# Patient Record
Sex: MALE | Race: WHITE | NOT HISPANIC OR LATINO | URBAN - METROPOLITAN AREA
[De-identification: names, ages, dates, MRNs, and addresses within clinical notes are randomized per-mention and may not be internally consistent; named-entity substitution may affect disease eponyms.]

---

## 2017-02-20 ENCOUNTER — OUTPATIENT (OUTPATIENT)
Dept: OUTPATIENT SERVICES | Facility: HOSPITAL | Age: 67
LOS: 1 days | Discharge: HOME | End: 2017-02-20

## 2017-06-27 DIAGNOSIS — E78.00 PURE HYPERCHOLESTEROLEMIA, UNSPECIFIED: ICD-10-CM

## 2017-06-27 DIAGNOSIS — E11.9 TYPE 2 DIABETES MELLITUS WITHOUT COMPLICATIONS: ICD-10-CM

## 2017-06-27 DIAGNOSIS — I25.10 ATHEROSCLEROTIC HEART DISEASE OF NATIVE CORONARY ARTERY WITHOUT ANGINA PECTORIS: ICD-10-CM

## 2017-09-07 ENCOUNTER — OUTPATIENT (OUTPATIENT)
Dept: OUTPATIENT SERVICES | Facility: HOSPITAL | Age: 67
LOS: 1 days | Discharge: HOME | End: 2017-09-07

## 2017-09-07 DIAGNOSIS — E78.00 PURE HYPERCHOLESTEROLEMIA, UNSPECIFIED: ICD-10-CM

## 2017-09-07 DIAGNOSIS — M19.90 UNSPECIFIED OSTEOARTHRITIS, UNSPECIFIED SITE: ICD-10-CM

## 2017-09-07 DIAGNOSIS — D51.0 VITAMIN B12 DEFICIENCY ANEMIA DUE TO INTRINSIC FACTOR DEFICIENCY: ICD-10-CM

## 2017-09-07 DIAGNOSIS — I25.10 ATHEROSCLEROTIC HEART DISEASE OF NATIVE CORONARY ARTERY WITHOUT ANGINA PECTORIS: ICD-10-CM

## 2017-09-07 DIAGNOSIS — E11.9 TYPE 2 DIABETES MELLITUS WITHOUT COMPLICATIONS: ICD-10-CM

## 2017-11-20 ENCOUNTER — OUTPATIENT (OUTPATIENT)
Dept: OUTPATIENT SERVICES | Facility: HOSPITAL | Age: 67
LOS: 1 days | Discharge: HOME | End: 2017-11-20

## 2017-11-20 DIAGNOSIS — E78.00 PURE HYPERCHOLESTEROLEMIA, UNSPECIFIED: ICD-10-CM

## 2017-11-20 DIAGNOSIS — E03.9 HYPOTHYROIDISM, UNSPECIFIED: ICD-10-CM

## 2017-11-20 DIAGNOSIS — E11.9 TYPE 2 DIABETES MELLITUS WITHOUT COMPLICATIONS: ICD-10-CM

## 2017-11-20 DIAGNOSIS — I10 ESSENTIAL (PRIMARY) HYPERTENSION: ICD-10-CM

## 2017-12-13 ENCOUNTER — OUTPATIENT (OUTPATIENT)
Dept: OUTPATIENT SERVICES | Facility: HOSPITAL | Age: 67
LOS: 1 days | Discharge: HOME | End: 2017-12-13

## 2017-12-13 DIAGNOSIS — R73.09 OTHER ABNORMAL GLUCOSE: ICD-10-CM

## 2017-12-13 DIAGNOSIS — E78.00 PURE HYPERCHOLESTEROLEMIA, UNSPECIFIED: ICD-10-CM

## 2017-12-13 DIAGNOSIS — E55.9 VITAMIN D DEFICIENCY, UNSPECIFIED: ICD-10-CM

## 2017-12-20 ENCOUNTER — OUTPATIENT (OUTPATIENT)
Dept: OUTPATIENT SERVICES | Facility: HOSPITAL | Age: 67
LOS: 1 days | Discharge: HOME | End: 2017-12-20

## 2017-12-20 DIAGNOSIS — R05 COUGH: ICD-10-CM

## 2017-12-20 DIAGNOSIS — R91.1 SOLITARY PULMONARY NODULE: ICD-10-CM

## 2018-03-14 ENCOUNTER — OUTPATIENT (OUTPATIENT)
Dept: OUTPATIENT SERVICES | Facility: HOSPITAL | Age: 68
LOS: 1 days | Discharge: HOME | End: 2018-03-14

## 2018-03-14 DIAGNOSIS — I25.10 ATHEROSCLEROTIC HEART DISEASE OF NATIVE CORONARY ARTERY WITHOUT ANGINA PECTORIS: ICD-10-CM

## 2018-03-14 DIAGNOSIS — E78.00 PURE HYPERCHOLESTEROLEMIA, UNSPECIFIED: ICD-10-CM

## 2018-03-14 DIAGNOSIS — Z79.899 OTHER LONG TERM (CURRENT) DRUG THERAPY: ICD-10-CM

## 2018-05-08 ENCOUNTER — OUTPATIENT (OUTPATIENT)
Dept: OUTPATIENT SERVICES | Facility: HOSPITAL | Age: 68
LOS: 1 days | Discharge: HOME | End: 2018-05-08

## 2018-05-08 DIAGNOSIS — E78.01 FAMILIAL HYPERCHOLESTEROLEMIA: ICD-10-CM

## 2018-05-08 DIAGNOSIS — E11.9 TYPE 2 DIABETES MELLITUS WITHOUT COMPLICATIONS: ICD-10-CM

## 2018-05-08 DIAGNOSIS — I80.9 PHLEBITIS AND THROMBOPHLEBITIS OF UNSPECIFIED SITE: ICD-10-CM

## 2018-05-11 ENCOUNTER — OUTPATIENT (OUTPATIENT)
Dept: OUTPATIENT SERVICES | Facility: HOSPITAL | Age: 68
LOS: 1 days | Discharge: HOME | End: 2018-05-11

## 2018-05-11 DIAGNOSIS — M79.662 PAIN IN LEFT LOWER LEG: ICD-10-CM

## 2018-06-19 ENCOUNTER — OUTPATIENT (OUTPATIENT)
Dept: OUTPATIENT SERVICES | Facility: HOSPITAL | Age: 68
LOS: 1 days | Discharge: HOME | End: 2018-06-19

## 2018-06-19 DIAGNOSIS — R35.0 FREQUENCY OF MICTURITION: ICD-10-CM

## 2018-06-19 DIAGNOSIS — R30.0 DYSURIA: ICD-10-CM

## 2018-12-20 ENCOUNTER — OUTPATIENT (OUTPATIENT)
Dept: OUTPATIENT SERVICES | Facility: HOSPITAL | Age: 68
LOS: 1 days | Discharge: HOME | End: 2018-12-20

## 2018-12-20 DIAGNOSIS — I25.10 ATHEROSCLEROTIC HEART DISEASE OF NATIVE CORONARY ARTERY WITHOUT ANGINA PECTORIS: ICD-10-CM

## 2019-01-02 ENCOUNTER — OUTPATIENT (OUTPATIENT)
Dept: OUTPATIENT SERVICES | Facility: HOSPITAL | Age: 69
LOS: 1 days | Discharge: HOME | End: 2019-01-02

## 2019-01-02 DIAGNOSIS — E78.00 PURE HYPERCHOLESTEROLEMIA, UNSPECIFIED: ICD-10-CM

## 2019-01-02 DIAGNOSIS — E11.9 TYPE 2 DIABETES MELLITUS WITHOUT COMPLICATIONS: ICD-10-CM

## 2019-01-30 ENCOUNTER — OUTPATIENT (OUTPATIENT)
Dept: OUTPATIENT SERVICES | Facility: HOSPITAL | Age: 69
LOS: 1 days | Discharge: HOME | End: 2019-01-30

## 2019-01-30 DIAGNOSIS — R53.83 OTHER FATIGUE: ICD-10-CM

## 2019-01-30 DIAGNOSIS — I73.9 PERIPHERAL VASCULAR DISEASE, UNSPECIFIED: ICD-10-CM

## 2019-01-30 DIAGNOSIS — I65.23 OCCLUSION AND STENOSIS OF BILATERAL CAROTID ARTERIES: ICD-10-CM

## 2019-01-30 DIAGNOSIS — R51 HEADACHE: ICD-10-CM

## 2019-06-13 ENCOUNTER — APPOINTMENT (OUTPATIENT)
Dept: CARDIOLOGY | Facility: CLINIC | Age: 69
End: 2019-06-13

## 2019-06-17 ENCOUNTER — OUTPATIENT (OUTPATIENT)
Dept: OUTPATIENT SERVICES | Facility: HOSPITAL | Age: 69
LOS: 1 days | Discharge: HOME | End: 2019-06-17

## 2019-06-17 ENCOUNTER — LABORATORY RESULT (OUTPATIENT)
Age: 69
End: 2019-06-17

## 2019-06-17 DIAGNOSIS — E78.00 PURE HYPERCHOLESTEROLEMIA, UNSPECIFIED: ICD-10-CM

## 2019-06-17 DIAGNOSIS — Z79.899 OTHER LONG TERM (CURRENT) DRUG THERAPY: ICD-10-CM

## 2019-06-17 DIAGNOSIS — E11.9 TYPE 2 DIABETES MELLITUS WITHOUT COMPLICATIONS: ICD-10-CM

## 2019-06-17 DIAGNOSIS — I25.10 ATHEROSCLEROTIC HEART DISEASE OF NATIVE CORONARY ARTERY WITHOUT ANGINA PECTORIS: ICD-10-CM

## 2019-06-20 ENCOUNTER — APPOINTMENT (OUTPATIENT)
Dept: CARDIOLOGY | Facility: CLINIC | Age: 69
End: 2019-06-20

## 2019-09-25 ENCOUNTER — OUTPATIENT (OUTPATIENT)
Dept: OUTPATIENT SERVICES | Facility: HOSPITAL | Age: 69
LOS: 1 days | Discharge: HOME | End: 2019-09-25

## 2019-09-25 DIAGNOSIS — E78.00 PURE HYPERCHOLESTEROLEMIA, UNSPECIFIED: ICD-10-CM

## 2019-09-25 DIAGNOSIS — E11.9 TYPE 2 DIABETES MELLITUS WITHOUT COMPLICATIONS: ICD-10-CM

## 2019-12-17 ENCOUNTER — OUTPATIENT (OUTPATIENT)
Dept: OUTPATIENT SERVICES | Facility: HOSPITAL | Age: 69
LOS: 1 days | Discharge: HOME | End: 2019-12-17
Payer: MEDICARE

## 2019-12-17 DIAGNOSIS — R91.8 OTHER NONSPECIFIC ABNORMAL FINDING OF LUNG FIELD: ICD-10-CM

## 2019-12-17 PROCEDURE — 71250 CT THORAX DX C-: CPT | Mod: 26

## 2020-01-23 ENCOUNTER — APPOINTMENT (OUTPATIENT)
Dept: CARDIOLOGY | Facility: CLINIC | Age: 70
End: 2020-01-23
Payer: MEDICARE

## 2020-01-23 PROCEDURE — 93000 ELECTROCARDIOGRAM COMPLETE: CPT

## 2020-01-23 PROCEDURE — 99214 OFFICE O/P EST MOD 30 MIN: CPT

## 2020-07-29 ENCOUNTER — RECORD ABSTRACTING (OUTPATIENT)
Age: 70
End: 2020-07-29

## 2020-07-29 DIAGNOSIS — Z78.9 OTHER SPECIFIED HEALTH STATUS: ICD-10-CM

## 2020-07-29 DIAGNOSIS — I35.1 NONRHEUMATIC AORTIC (VALVE) INSUFFICIENCY: ICD-10-CM

## 2020-07-29 DIAGNOSIS — Z98.61 CORONARY ANGIOPLASTY STATUS: ICD-10-CM

## 2020-07-29 RX ORDER — TAMSULOSIN HYDROCHLORIDE 0.4 MG/1
0.4 CAPSULE ORAL
Refills: 0 | Status: ACTIVE | COMMUNITY

## 2020-08-05 ENCOUNTER — APPOINTMENT (OUTPATIENT)
Dept: CARDIOLOGY | Facility: CLINIC | Age: 70
End: 2020-08-05
Payer: MEDICARE

## 2020-08-05 PROCEDURE — 93306 TTE W/DOPPLER COMPLETE: CPT

## 2020-08-07 ENCOUNTER — LABORATORY RESULT (OUTPATIENT)
Age: 70
End: 2020-08-07

## 2020-08-13 ENCOUNTER — APPOINTMENT (OUTPATIENT)
Dept: CARDIOLOGY | Facility: CLINIC | Age: 70
End: 2020-08-13
Payer: MEDICARE

## 2020-08-13 VITALS
WEIGHT: 225 LBS | HEIGHT: 76 IN | TEMPERATURE: 98.3 F | DIASTOLIC BLOOD PRESSURE: 80 MMHG | SYSTOLIC BLOOD PRESSURE: 148 MMHG | HEART RATE: 84 BPM | BODY MASS INDEX: 27.4 KG/M2

## 2020-08-13 DIAGNOSIS — Z00.00 ENCOUNTER FOR GENERAL ADULT MEDICAL EXAMINATION W/OUT ABNORMAL FINDINGS: ICD-10-CM

## 2020-08-13 PROCEDURE — 99214 OFFICE O/P EST MOD 30 MIN: CPT

## 2020-08-13 PROCEDURE — 93000 ELECTROCARDIOGRAM COMPLETE: CPT

## 2020-09-20 ENCOUNTER — RX RENEWAL (OUTPATIENT)
Age: 70
End: 2020-09-20

## 2021-04-08 ENCOUNTER — APPOINTMENT (OUTPATIENT)
Dept: CARDIOLOGY | Facility: CLINIC | Age: 71
End: 2021-04-08

## 2021-04-19 ENCOUNTER — LABORATORY RESULT (OUTPATIENT)
Age: 71
End: 2021-04-19

## 2021-06-03 ENCOUNTER — APPOINTMENT (OUTPATIENT)
Dept: CARDIOLOGY | Facility: CLINIC | Age: 71
End: 2021-06-03

## 2022-01-04 ENCOUNTER — RX RENEWAL (OUTPATIENT)
Age: 72
End: 2022-01-04

## 2022-03-01 ENCOUNTER — LABORATORY RESULT (OUTPATIENT)
Age: 72
End: 2022-03-01

## 2022-03-16 ENCOUNTER — APPOINTMENT (OUTPATIENT)
Dept: CARDIOLOGY | Facility: CLINIC | Age: 72
End: 2022-03-16
Payer: MEDICARE

## 2022-03-16 VITALS — SYSTOLIC BLOOD PRESSURE: 142 MMHG | DIASTOLIC BLOOD PRESSURE: 80 MMHG

## 2022-03-16 PROCEDURE — 99213 OFFICE O/P EST LOW 20 MIN: CPT

## 2022-03-16 PROCEDURE — 93000 ELECTROCARDIOGRAM COMPLETE: CPT

## 2022-09-14 ENCOUNTER — APPOINTMENT (OUTPATIENT)
Dept: CARDIOLOGY | Facility: CLINIC | Age: 72
End: 2022-09-14

## 2022-12-13 ENCOUNTER — APPOINTMENT (OUTPATIENT)
Dept: CARDIOLOGY | Facility: CLINIC | Age: 72
End: 2022-12-13

## 2022-12-13 VITALS
HEART RATE: 60 BPM | DIASTOLIC BLOOD PRESSURE: 82 MMHG | HEIGHT: 76 IN | BODY MASS INDEX: 27.64 KG/M2 | OXYGEN SATURATION: 98 % | WEIGHT: 227 LBS | SYSTOLIC BLOOD PRESSURE: 154 MMHG

## 2022-12-13 DIAGNOSIS — Z78.9 OTHER SPECIFIED HEALTH STATUS: ICD-10-CM

## 2022-12-13 PROCEDURE — 99214 OFFICE O/P EST MOD 30 MIN: CPT | Mod: 25

## 2022-12-13 PROCEDURE — 93000 ELECTROCARDIOGRAM COMPLETE: CPT

## 2022-12-13 RX ORDER — CLOPIDOGREL 75 MG/1
75 TABLET, FILM COATED ORAL DAILY
Refills: 0 | Status: DISCONTINUED | COMMUNITY
End: 2022-12-13

## 2022-12-13 RX ORDER — ASPIRIN 81 MG
81 TABLET, DELAYED RELEASE (ENTERIC COATED) ORAL DAILY
Refills: 0 | Status: DISCONTINUED | COMMUNITY
End: 2022-12-13

## 2022-12-13 NOTE — ASSESSMENT
[FreeTextEntry1] : Mr. 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 He is borderline today and does not wish to be started on any new medications for the time being.\par \par RTC 6 months, sooner as needed.

## 2022-12-13 NOTE — HISTORY OF PRESENT ILLNESS
[FreeTextEntry1] : Mr. Go is a 72-year-old man with history of CAD s/p PCI of the LAD/D1 2011, HTN, HLD, and DM2 presenting for follow up.\par \par Patient previously followed with Dr. Allison and was last seen in office 3/2022.\par Today, overall feels well, denies active cardiopulmonary complaints though does not exert himself much. He works on his motorcycle at home but does go for long walks or climb stairs often due to severe knee discomfort.\par \par TTE 8/2020\par - Normal biventricular function, mild LA dilatation, mild MR, mod Ao sclerosis, possible BAV, AoRoot 4.1cm.\par \par Carotid Doppler 2019\par - Negative\par \par NM Stress Exercise 2018\par - 6 mins, 7 METS, 95% MPHR\par - Negative study\par \par CT Chest 2019\par - Extensive coronary calcifications\par \par Labs:\par - , TG 80, HDL 36, LDL 73\par - Cr 1.0, K 4.2\par \par Cardiac meds:\par - ASA 81mg\par - Plavix 75mg\par - Atorva 80mg\par - Losartan-HCTZ 100-25mg\par - Amlodipine 5mg

## 2023-05-18 ENCOUNTER — APPOINTMENT (OUTPATIENT)
Dept: CARDIOLOGY | Facility: CLINIC | Age: 73
End: 2023-05-18
Payer: MEDICARE

## 2023-05-18 PROCEDURE — 93306 TTE W/DOPPLER COMPLETE: CPT

## 2023-06-13 ENCOUNTER — APPOINTMENT (OUTPATIENT)
Dept: CARDIOLOGY | Facility: CLINIC | Age: 73
End: 2023-06-13
Payer: MEDICARE

## 2023-06-13 VITALS
HEIGHT: 76 IN | DIASTOLIC BLOOD PRESSURE: 84 MMHG | HEART RATE: 89 BPM | SYSTOLIC BLOOD PRESSURE: 138 MMHG | WEIGHT: 220 LBS | BODY MASS INDEX: 26.79 KG/M2

## 2023-06-13 PROCEDURE — 93000 ELECTROCARDIOGRAM COMPLETE: CPT

## 2023-06-13 PROCEDURE — 99214 OFFICE O/P EST MOD 30 MIN: CPT | Mod: 25

## 2023-06-13 RX ORDER — GLYBURIDE 5 MG/1
5 TABLET ORAL DAILY
Refills: 0 | Status: ACTIVE | COMMUNITY
Start: 2022-06-27

## 2023-06-13 RX ORDER — METFORMIN HYDROCHLORIDE 1000 MG/1
1000 TABLET, COATED ORAL
Refills: 0 | Status: ACTIVE | COMMUNITY
Start: 2021-10-22

## 2023-06-13 NOTE — HISTORY OF PRESENT ILLNESS
[FreeTextEntry1] : Mr. Go is a 73-year-old man with history of CAD s/p PCI of the LAD/D1 2011, HTN, HLD, and DM2 presenting for follow up.\par \par Patient previously followed with Dr. Allison and was last seen in office 3/2022.\par Today, overall feels well, denies active cardiopulmonary complaints though does not exert himself much. He works on his motorcycle at home but does go for long walks or climb stairs often due to severe knee discomfort.\par \par Notes he has a "terrible" diet.\par \par TTE 8/2020 - Normal biventricular function, mild LA dilatation, mild MR, mod Ao sclerosis, possible BAV, AoRoot 4.1cm.\par TTE 5/2023 - Normal systolic function, G1DD, sclerotic Ao (trileaflet), Ao root 4.2cm, AscAo 3.8cm.\par \par Carotid Doppler 2019\par - Negative\par \par NM Stress Exercise 2018\par - 6 mins, 7 METS, 95% MPHR\par - Negative study\par \par CT Chest 2019\par - Extensive coronary calcifications\par \par Labs:\par - , TG 80, HDL 36, LDL 73\par - Cr 1.0, K 4.2\par \par Cardiac meds:\par - ASA 81mg\par - Plavix 75mg\par - Atorva 80mg\par - Losartan-HCTZ 100-25mg\par - Amlodipine 5mg

## 2023-06-13 NOTE — ASSESSMENT
[FreeTextEntry1] : Mr. Go is a 73-year-old man with history of CAD s/p PCI of the LAD/D1 2011, HTN, HLD, and DM2 presenting for follow up.\par \par Impression:\par (1) CAD, elective PCI 2011 of the LAD/D1, no anginal complaints since\par (2) HLD, LDL 73, goal <70\par (3) HTN\par (4) NIDDM2\par (5) Ao root aneurysm\par \par Plan:\par - Low threshold for stress testing if patient develops anginal complaints. He is very sedentary and may not be exerting himself enough to manifest symptoms.\par - TTE annually to evaluate Ao root aneurysm\par - Continue amlodipine 10mg daily\par - Continue losartan-HCTZ 100-25mg daily\par - Continue plavix\par - Continue high intensity statin; consider adding Zetia to reach target <70. He is borderline today and does not wish to be started on any new medications for the time being.\par \par RTC 6 months, sooner as needed.

## 2023-09-19 ENCOUNTER — APPOINTMENT (OUTPATIENT)
Dept: CARDIOLOGY | Facility: CLINIC | Age: 73
End: 2023-09-19
Payer: MEDICARE

## 2023-09-19 VITALS
HEIGHT: 76 IN | WEIGHT: 225 LBS | BODY MASS INDEX: 27.4 KG/M2 | HEART RATE: 92 BPM | SYSTOLIC BLOOD PRESSURE: 122 MMHG | DIASTOLIC BLOOD PRESSURE: 70 MMHG

## 2023-09-19 PROCEDURE — 99214 OFFICE O/P EST MOD 30 MIN: CPT | Mod: 25

## 2023-09-19 PROCEDURE — 93000 ELECTROCARDIOGRAM COMPLETE: CPT

## 2023-10-19 ENCOUNTER — TRANSCRIPTION ENCOUNTER (OUTPATIENT)
Age: 73
End: 2023-10-19

## 2023-10-19 ENCOUNTER — INPATIENT (INPATIENT)
Facility: HOSPITAL | Age: 73
LOS: 0 days | Discharge: HOME | DRG: 322 | End: 2023-10-20
Attending: INTERNAL MEDICINE | Admitting: INTERNAL MEDICINE
Payer: MEDICARE

## 2023-10-19 VITALS
HEART RATE: 98 BPM | SYSTOLIC BLOOD PRESSURE: 167 MMHG | DIASTOLIC BLOOD PRESSURE: 82 MMHG | TEMPERATURE: 98 F | OXYGEN SATURATION: 98 % | RESPIRATION RATE: 16 BRPM | WEIGHT: 216.93 LBS

## 2023-10-19 DIAGNOSIS — I21.4 NON-ST ELEVATION (NSTEMI) MYOCARDIAL INFARCTION: ICD-10-CM

## 2023-10-19 LAB
ALBUMIN SERPL ELPH-MCNC: 4.6 G/DL — SIGNIFICANT CHANGE UP (ref 3.5–5.2)
ALBUMIN SERPL ELPH-MCNC: 4.6 G/DL — SIGNIFICANT CHANGE UP (ref 3.5–5.2)
ALP SERPL-CCNC: 112 U/L — SIGNIFICANT CHANGE UP (ref 30–115)
ALP SERPL-CCNC: 112 U/L — SIGNIFICANT CHANGE UP (ref 30–115)
ALT FLD-CCNC: 28 U/L — SIGNIFICANT CHANGE UP (ref 0–41)
ALT FLD-CCNC: 28 U/L — SIGNIFICANT CHANGE UP (ref 0–41)
ANION GAP SERPL CALC-SCNC: 10 MMOL/L — SIGNIFICANT CHANGE UP (ref 7–14)
ANION GAP SERPL CALC-SCNC: 10 MMOL/L — SIGNIFICANT CHANGE UP (ref 7–14)
AST SERPL-CCNC: 20 U/L — SIGNIFICANT CHANGE UP (ref 0–41)
AST SERPL-CCNC: 20 U/L — SIGNIFICANT CHANGE UP (ref 0–41)
BASOPHILS # BLD AUTO: 0.05 K/UL — SIGNIFICANT CHANGE UP (ref 0–0.2)
BASOPHILS # BLD AUTO: 0.05 K/UL — SIGNIFICANT CHANGE UP (ref 0–0.2)
BASOPHILS NFR BLD AUTO: 0.6 % — SIGNIFICANT CHANGE UP (ref 0–1)
BASOPHILS NFR BLD AUTO: 0.6 % — SIGNIFICANT CHANGE UP (ref 0–1)
BILIRUB SERPL-MCNC: 0.4 MG/DL — SIGNIFICANT CHANGE UP (ref 0.2–1.2)
BILIRUB SERPL-MCNC: 0.4 MG/DL — SIGNIFICANT CHANGE UP (ref 0.2–1.2)
BUN SERPL-MCNC: 15 MG/DL — SIGNIFICANT CHANGE UP (ref 10–20)
BUN SERPL-MCNC: 15 MG/DL — SIGNIFICANT CHANGE UP (ref 10–20)
CALCIUM SERPL-MCNC: 9.4 MG/DL — SIGNIFICANT CHANGE UP (ref 8.4–10.5)
CALCIUM SERPL-MCNC: 9.4 MG/DL — SIGNIFICANT CHANGE UP (ref 8.4–10.5)
CHLORIDE SERPL-SCNC: 101 MMOL/L — SIGNIFICANT CHANGE UP (ref 98–110)
CHLORIDE SERPL-SCNC: 101 MMOL/L — SIGNIFICANT CHANGE UP (ref 98–110)
CO2 SERPL-SCNC: 28 MMOL/L — SIGNIFICANT CHANGE UP (ref 17–32)
CO2 SERPL-SCNC: 28 MMOL/L — SIGNIFICANT CHANGE UP (ref 17–32)
CREAT SERPL-MCNC: 1 MG/DL — SIGNIFICANT CHANGE UP (ref 0.7–1.5)
CREAT SERPL-MCNC: 1 MG/DL — SIGNIFICANT CHANGE UP (ref 0.7–1.5)
EGFR: 79 ML/MIN/1.73M2 — SIGNIFICANT CHANGE UP
EGFR: 79 ML/MIN/1.73M2 — SIGNIFICANT CHANGE UP
EOSINOPHIL # BLD AUTO: 0.24 K/UL — SIGNIFICANT CHANGE UP (ref 0–0.7)
EOSINOPHIL # BLD AUTO: 0.24 K/UL — SIGNIFICANT CHANGE UP (ref 0–0.7)
EOSINOPHIL NFR BLD AUTO: 2.7 % — SIGNIFICANT CHANGE UP (ref 0–8)
EOSINOPHIL NFR BLD AUTO: 2.7 % — SIGNIFICANT CHANGE UP (ref 0–8)
GLUCOSE BLDC GLUCOMTR-MCNC: 105 MG/DL — HIGH (ref 70–99)
GLUCOSE BLDC GLUCOMTR-MCNC: 105 MG/DL — HIGH (ref 70–99)
GLUCOSE BLDC GLUCOMTR-MCNC: 115 MG/DL — HIGH (ref 70–99)
GLUCOSE BLDC GLUCOMTR-MCNC: 115 MG/DL — HIGH (ref 70–99)
GLUCOSE SERPL-MCNC: 234 MG/DL — HIGH (ref 70–99)
GLUCOSE SERPL-MCNC: 234 MG/DL — HIGH (ref 70–99)
HCT VFR BLD CALC: 47.9 % — SIGNIFICANT CHANGE UP (ref 42–52)
HCT VFR BLD CALC: 47.9 % — SIGNIFICANT CHANGE UP (ref 42–52)
HGB BLD-MCNC: 15.7 G/DL — SIGNIFICANT CHANGE UP (ref 14–18)
HGB BLD-MCNC: 15.7 G/DL — SIGNIFICANT CHANGE UP (ref 14–18)
IMM GRANULOCYTES NFR BLD AUTO: 0.6 % — HIGH (ref 0.1–0.3)
IMM GRANULOCYTES NFR BLD AUTO: 0.6 % — HIGH (ref 0.1–0.3)
LYMPHOCYTES # BLD AUTO: 0.99 K/UL — LOW (ref 1.2–3.4)
LYMPHOCYTES # BLD AUTO: 0.99 K/UL — LOW (ref 1.2–3.4)
LYMPHOCYTES # BLD AUTO: 11.3 % — LOW (ref 20.5–51.1)
LYMPHOCYTES # BLD AUTO: 11.3 % — LOW (ref 20.5–51.1)
MCHC RBC-ENTMCNC: 28 PG — SIGNIFICANT CHANGE UP (ref 27–31)
MCHC RBC-ENTMCNC: 28 PG — SIGNIFICANT CHANGE UP (ref 27–31)
MCHC RBC-ENTMCNC: 32.8 G/DL — SIGNIFICANT CHANGE UP (ref 32–37)
MCHC RBC-ENTMCNC: 32.8 G/DL — SIGNIFICANT CHANGE UP (ref 32–37)
MCV RBC AUTO: 85.5 FL — SIGNIFICANT CHANGE UP (ref 80–94)
MCV RBC AUTO: 85.5 FL — SIGNIFICANT CHANGE UP (ref 80–94)
MONOCYTES # BLD AUTO: 0.54 K/UL — SIGNIFICANT CHANGE UP (ref 0.1–0.6)
MONOCYTES # BLD AUTO: 0.54 K/UL — SIGNIFICANT CHANGE UP (ref 0.1–0.6)
MONOCYTES NFR BLD AUTO: 6.2 % — SIGNIFICANT CHANGE UP (ref 1.7–9.3)
MONOCYTES NFR BLD AUTO: 6.2 % — SIGNIFICANT CHANGE UP (ref 1.7–9.3)
NEUTROPHILS # BLD AUTO: 6.89 K/UL — HIGH (ref 1.4–6.5)
NEUTROPHILS # BLD AUTO: 6.89 K/UL — HIGH (ref 1.4–6.5)
NEUTROPHILS NFR BLD AUTO: 78.6 % — HIGH (ref 42.2–75.2)
NEUTROPHILS NFR BLD AUTO: 78.6 % — HIGH (ref 42.2–75.2)
NRBC # BLD: 0 /100 WBCS — SIGNIFICANT CHANGE UP (ref 0–0)
NRBC # BLD: 0 /100 WBCS — SIGNIFICANT CHANGE UP (ref 0–0)
PLATELET # BLD AUTO: 239 K/UL — SIGNIFICANT CHANGE UP (ref 130–400)
PLATELET # BLD AUTO: 239 K/UL — SIGNIFICANT CHANGE UP (ref 130–400)
PMV BLD: 10 FL — SIGNIFICANT CHANGE UP (ref 7.4–10.4)
PMV BLD: 10 FL — SIGNIFICANT CHANGE UP (ref 7.4–10.4)
POTASSIUM SERPL-MCNC: 4.1 MMOL/L — SIGNIFICANT CHANGE UP (ref 3.5–5)
POTASSIUM SERPL-MCNC: 4.1 MMOL/L — SIGNIFICANT CHANGE UP (ref 3.5–5)
POTASSIUM SERPL-SCNC: 4.1 MMOL/L — SIGNIFICANT CHANGE UP (ref 3.5–5)
POTASSIUM SERPL-SCNC: 4.1 MMOL/L — SIGNIFICANT CHANGE UP (ref 3.5–5)
PROT SERPL-MCNC: 6.8 G/DL — SIGNIFICANT CHANGE UP (ref 6–8)
PROT SERPL-MCNC: 6.8 G/DL — SIGNIFICANT CHANGE UP (ref 6–8)
RBC # BLD: 5.6 M/UL — SIGNIFICANT CHANGE UP (ref 4.7–6.1)
RBC # BLD: 5.6 M/UL — SIGNIFICANT CHANGE UP (ref 4.7–6.1)
RBC # FLD: 14.1 % — SIGNIFICANT CHANGE UP (ref 11.5–14.5)
RBC # FLD: 14.1 % — SIGNIFICANT CHANGE UP (ref 11.5–14.5)
SODIUM SERPL-SCNC: 139 MMOL/L — SIGNIFICANT CHANGE UP (ref 135–146)
SODIUM SERPL-SCNC: 139 MMOL/L — SIGNIFICANT CHANGE UP (ref 135–146)
TROPONIN T SERPL-MCNC: 0.07 NG/ML — CRITICAL HIGH
TROPONIN T SERPL-MCNC: 0.07 NG/ML — CRITICAL HIGH
WBC # BLD: 8.76 K/UL — SIGNIFICANT CHANGE UP (ref 4.8–10.8)
WBC # BLD: 8.76 K/UL — SIGNIFICANT CHANGE UP (ref 4.8–10.8)
WBC # FLD AUTO: 8.76 K/UL — SIGNIFICANT CHANGE UP (ref 4.8–10.8)
WBC # FLD AUTO: 8.76 K/UL — SIGNIFICANT CHANGE UP (ref 4.8–10.8)

## 2023-10-19 PROCEDURE — C1769: CPT

## 2023-10-19 PROCEDURE — C1874: CPT

## 2023-10-19 PROCEDURE — 80053 COMPREHEN METABOLIC PANEL: CPT

## 2023-10-19 PROCEDURE — 83735 ASSAY OF MAGNESIUM: CPT

## 2023-10-19 PROCEDURE — 93458 L HRT ARTERY/VENTRICLE ANGIO: CPT | Mod: 59

## 2023-10-19 PROCEDURE — 99285 EMERGENCY DEPT VISIT HI MDM: CPT

## 2023-10-19 PROCEDURE — 93458 L HRT ARTERY/VENTRICLE ANGIO: CPT | Mod: 26,XU

## 2023-10-19 PROCEDURE — C1725: CPT

## 2023-10-19 PROCEDURE — C1894: CPT

## 2023-10-19 PROCEDURE — 85025 COMPLETE CBC W/AUTO DIFF WBC: CPT

## 2023-10-19 PROCEDURE — 86803 HEPATITIS C AB TEST: CPT

## 2023-10-19 PROCEDURE — 93005 ELECTROCARDIOGRAM TRACING: CPT

## 2023-10-19 PROCEDURE — 80061 LIPID PANEL: CPT

## 2023-10-19 PROCEDURE — C1887: CPT

## 2023-10-19 PROCEDURE — 36415 COLL VENOUS BLD VENIPUNCTURE: CPT

## 2023-10-19 PROCEDURE — 92928 PRQ TCAT PLMT NTRAC ST 1 LES: CPT | Mod: RC

## 2023-10-19 PROCEDURE — C9600: CPT | Mod: RC

## 2023-10-19 PROCEDURE — 82962 GLUCOSE BLOOD TEST: CPT

## 2023-10-19 PROCEDURE — 71045 X-RAY EXAM CHEST 1 VIEW: CPT | Mod: 26

## 2023-10-19 RX ORDER — SODIUM CHLORIDE 9 MG/ML
1000 INJECTION INTRAMUSCULAR; INTRAVENOUS; SUBCUTANEOUS
Refills: 0 | Status: DISCONTINUED | OUTPATIENT
Start: 2023-10-19 | End: 2023-10-20

## 2023-10-19 RX ORDER — METFORMIN HYDROCHLORIDE 850 MG/1
1 TABLET ORAL
Refills: 0 | DISCHARGE

## 2023-10-19 RX ORDER — INFLUENZA VIRUS VACCINE 15; 15; 15; 15 UG/.5ML; UG/.5ML; UG/.5ML; UG/.5ML
0.7 SUSPENSION INTRAMUSCULAR ONCE
Refills: 0 | Status: DISCONTINUED | OUTPATIENT
Start: 2023-10-19 | End: 2023-10-20

## 2023-10-19 RX ORDER — DEXTROSE 50 % IN WATER 50 %
25 SYRINGE (ML) INTRAVENOUS ONCE
Refills: 0 | Status: DISCONTINUED | OUTPATIENT
Start: 2023-10-19 | End: 2023-10-20

## 2023-10-19 RX ORDER — ATORVASTATIN CALCIUM 80 MG/1
1 TABLET, FILM COATED ORAL
Refills: 0 | DISCHARGE

## 2023-10-19 RX ORDER — CLOPIDOGREL BISULFATE 75 MG/1
1 TABLET, FILM COATED ORAL
Refills: 0 | DISCHARGE

## 2023-10-19 RX ORDER — GLYBURIDE 5 MG
1 TABLET ORAL
Refills: 0 | DISCHARGE

## 2023-10-19 RX ORDER — METOPROLOL TARTRATE 50 MG
25 TABLET ORAL
Refills: 0 | Status: DISCONTINUED | OUTPATIENT
Start: 2023-10-19 | End: 2023-10-20

## 2023-10-19 RX ORDER — AMLODIPINE BESYLATE 2.5 MG/1
1 TABLET ORAL
Refills: 0 | DISCHARGE

## 2023-10-19 RX ORDER — ASPIRIN/CALCIUM CARB/MAGNESIUM 324 MG
81 TABLET ORAL DAILY
Refills: 0 | Status: DISCONTINUED | OUTPATIENT
Start: 2023-10-20 | End: 2023-10-20

## 2023-10-19 RX ORDER — INSULIN LISPRO 100/ML
VIAL (ML) SUBCUTANEOUS
Refills: 0 | Status: DISCONTINUED | OUTPATIENT
Start: 2023-10-19 | End: 2023-10-20

## 2023-10-19 RX ORDER — SODIUM CHLORIDE 9 MG/ML
1000 INJECTION, SOLUTION INTRAVENOUS
Refills: 0 | Status: DISCONTINUED | OUTPATIENT
Start: 2023-10-19 | End: 2023-10-20

## 2023-10-19 RX ORDER — ATORVASTATIN CALCIUM 80 MG/1
80 TABLET, FILM COATED ORAL AT BEDTIME
Refills: 0 | Status: DISCONTINUED | OUTPATIENT
Start: 2023-10-19 | End: 2023-10-20

## 2023-10-19 RX ORDER — INSULIN LISPRO 100/ML
VIAL (ML) SUBCUTANEOUS AT BEDTIME
Refills: 0 | Status: DISCONTINUED | OUTPATIENT
Start: 2023-10-19 | End: 2023-10-20

## 2023-10-19 RX ORDER — TAMSULOSIN HYDROCHLORIDE 0.4 MG/1
0.4 CAPSULE ORAL AT BEDTIME
Refills: 0 | Status: DISCONTINUED | OUTPATIENT
Start: 2023-10-19 | End: 2023-10-20

## 2023-10-19 RX ORDER — DEXTROSE 50 % IN WATER 50 %
15 SYRINGE (ML) INTRAVENOUS ONCE
Refills: 0 | Status: DISCONTINUED | OUTPATIENT
Start: 2023-10-19 | End: 2023-10-20

## 2023-10-19 RX ORDER — TAMSULOSIN HYDROCHLORIDE 0.4 MG/1
1 CAPSULE ORAL
Refills: 0 | DISCHARGE

## 2023-10-19 RX ORDER — LANOLIN ALCOHOL/MO/W.PET/CERES
3 CREAM (GRAM) TOPICAL AT BEDTIME
Refills: 0 | Status: DISCONTINUED | OUTPATIENT
Start: 2023-10-19 | End: 2023-10-20

## 2023-10-19 RX ORDER — CLOPIDOGREL BISULFATE 75 MG/1
75 TABLET, FILM COATED ORAL DAILY
Refills: 0 | Status: DISCONTINUED | OUTPATIENT
Start: 2023-10-19 | End: 2023-10-20

## 2023-10-19 RX ORDER — LOSARTAN POTASSIUM 100 MG/1
100 TABLET, FILM COATED ORAL DAILY
Refills: 0 | Status: DISCONTINUED | OUTPATIENT
Start: 2023-10-20 | End: 2023-10-20

## 2023-10-19 RX ORDER — GLUCAGON INJECTION, SOLUTION 0.5 MG/.1ML
1 INJECTION, SOLUTION SUBCUTANEOUS ONCE
Refills: 0 | Status: DISCONTINUED | OUTPATIENT
Start: 2023-10-19 | End: 2023-10-20

## 2023-10-19 RX ORDER — LOSARTAN/HYDROCHLOROTHIAZIDE 100MG-25MG
1 TABLET ORAL
Refills: 0 | DISCHARGE

## 2023-10-19 RX ORDER — ASPIRIN/CALCIUM CARB/MAGNESIUM 324 MG
162 TABLET ORAL ONCE
Refills: 0 | Status: COMPLETED | OUTPATIENT
Start: 2023-10-19 | End: 2023-10-19

## 2023-10-19 RX ORDER — AMLODIPINE BESYLATE 2.5 MG/1
10 TABLET ORAL DAILY
Refills: 0 | Status: DISCONTINUED | OUTPATIENT
Start: 2023-10-19 | End: 2023-10-20

## 2023-10-19 RX ORDER — DEXTROSE 50 % IN WATER 50 %
12.5 SYRINGE (ML) INTRAVENOUS ONCE
Refills: 0 | Status: DISCONTINUED | OUTPATIENT
Start: 2023-10-19 | End: 2023-10-20

## 2023-10-19 RX ORDER — ACETAMINOPHEN 500 MG
650 TABLET ORAL EVERY 6 HOURS
Refills: 0 | Status: DISCONTINUED | OUTPATIENT
Start: 2023-10-19 | End: 2023-10-20

## 2023-10-19 RX ADMIN — SODIUM CHLORIDE 150 MILLILITER(S): 9 INJECTION INTRAMUSCULAR; INTRAVENOUS; SUBCUTANEOUS at 18:53

## 2023-10-19 RX ADMIN — TAMSULOSIN HYDROCHLORIDE 0.4 MILLIGRAM(S): 0.4 CAPSULE ORAL at 22:41

## 2023-10-19 RX ADMIN — Medication 0: at 22:48

## 2023-10-19 RX ADMIN — Medication 25 MILLIGRAM(S): at 22:41

## 2023-10-19 RX ADMIN — Medication 162 MILLIGRAM(S): at 14:51

## 2023-10-19 RX ADMIN — ATORVASTATIN CALCIUM 80 MILLIGRAM(S): 80 TABLET, FILM COATED ORAL at 22:41

## 2023-10-19 RX ADMIN — CLOPIDOGREL BISULFATE 75 MILLIGRAM(S): 75 TABLET, FILM COATED ORAL at 16:53

## 2023-10-19 RX ADMIN — AMLODIPINE BESYLATE 10 MILLIGRAM(S): 2.5 TABLET ORAL at 22:41

## 2023-10-19 NOTE — PATIENT PROFILE ADULT - FALL HARM RISK - HARM RISK INTERVENTIONS

## 2023-10-19 NOTE — ED PROVIDER NOTE - PROGRESS NOTE DETAILS
pk: labs reviewed, elevated trop noted, will admit to cards tele for further eval and management of nstemi

## 2023-10-19 NOTE — ED PROVIDER NOTE - CLINICAL SUMMARY MEDICAL DECISION MAKING FREE TEXT BOX
73-year-old male history of CAD PCI x2 presenting for evaluation of chest discomfort radiating to the left neck.  Currently pain-free.  No lower extremity pain or swelling.  No shortness of breath.  Well appearing, NAD, non toxic. NCAT PERRLA EOMI neck supple non tender normal wob cta bl rrr abdomen s nt nd no rebound no guarding WWPx4 neuro non focal.  Labs EKG imaging reviewed.  Currently still pain-free.  Status post cardiology evaluation.  Will admit for further evaluation.

## 2023-10-19 NOTE — ED PROVIDER NOTE - CONSULTANT FREE TEXT FOR MDM DISCUSSED CASE WITH QUESTION
Outreach attempt was made to schedule a Medicare Wellness Visit. This was the first attempt. Contact was not made, left message.   RECALL IN    
Cardiology

## 2023-10-19 NOTE — DISCHARGE NOTE PROVIDER - REASON FOR ADMISSION
PAST MEDICAL HISTORY:  Asthma no recent exacerbation, not using inhalers for a long time    Hypertension     Pancreatitis due to alcohol     chest pain

## 2023-10-19 NOTE — ED ADULT NURSE NOTE - NSFALLUNIVINTERV_ED_ALL_ED
Bed/Stretcher in lowest position, wheels locked, appropriate side rails in place/Call bell, personal items and telephone in reach/Instruct patient to call for assistance before getting out of bed/chair/stretcher/Non-slip footwear applied when patient is off stretcher/[place] to call system/Physically safe environment - no spills, clutter or unnecessary equipment/Purposeful proactive rounding/Room/bathroom lighting operational, light cord in reach

## 2023-10-19 NOTE — PATIENT PROFILE ADULT - FALL HARM RISK - FALL HARM RISK
Arlette Ruff)  Obstetrics and Gynecology  [address] [place] [address]  Phone: [phone]  Fax: [phone]  Follow Up Time:   
Other

## 2023-10-19 NOTE — DISCHARGE NOTE PROVIDER - CARE PROVIDER_API CALL
Raad Bolton  Cardiovascular Disease  [address] [place] [address], [address] [address]  Phone: [phone]  Fax: [phone]  Follow Up Time: 2 weeks

## 2023-10-19 NOTE — H&P ADULT - HISTORY OF PRESENT ILLNESS
Patient is a 73-year-old man with history of CAD s/p PCI of the LAD/D1 2011, HTN, HLD, and DM2 presenting to ED with c/o left chest wall discomfort.   Of note, he had similar discomfort on exertion while mowing the lawn in september which prompted him to visit Dr. Bolton and at the time declined stress test. He now presents today due to recurrent left sided chest pain radiating to left arm now occurring at rest.    Patient denies palpitations, diaphoresis, headaches, LH, dizziness, orthopnea, PND, LOC, cough, feeling bloated, N/V/D, or musculoskeletal pain. Endorses compliance with medications.    In ED, BP: 167/82 , HR: 98, resp. 16, temp: 97, SpO2: 98.    ECG with no acute Ischemic changes compare to prior ecg. first troponin 0.07. He will be admitted to cardiology telemetry for further ischemic evaluation     Diagnostic studies:  TTE 8/2020 - Normal biventricular function, mild LA dilatation, mild MR, mod Ao sclerosis, possible BAV, AoRoot 4.1cm.  TTE 5/2023 - Normal systolic function, G1DD, sclerotic Ao (trileaflet), Ao root 4.2cm, AscAo 3.8cm.    NM Stress Exercise 2018  - 6 mins, 7 METS, 95% MPHR  - Negative study    CT Chest 2019  - Extensive coronary calcifications   Patient is a 73-year-old man with history of CAD s/p PCI of the LAD/D1 2011, HTN, HLD, and DM2 presenting to ED with c/o left chest wall discomfort.   Of note, he had similar discomfort on exertion while mowing the lawn in september which prompted him to visit Dr. Bolton and at the time declined stress test. He now presents today due to recurrent left sided chest pain radiating to left arm now occurring at rest. He took ASA 81mg prior to ED arrival. Currently chest pain free.   Patient denies palpitations, diaphoresis, headaches, LH, dizziness, orthopnea, PND, LOC, cough, feeling bloated, N/V/D, or musculoskeletal pain. Endorses compliance with medications.    In ED, BP: 167/82 , HR: 98, resp. 16, temp: 97, SpO2: 98.    ECG with no acute Ischemic changes compare to prior ecg. first troponin 0.07. ASA 324MG given He will be admitted to cardiology telemetry for further ischemic evaluation     Diagnostic studies:  TTE 8/2020 - Normal biventricular function, mild LA dilatation, mild MR, mod Ao sclerosis, possible BAV, AoRoot 4.1cm.  TTE 5/2023 - Normal systolic function, G1DD, sclerotic Ao (trileaflet), Ao root 4.2cm, AscAo 3.8cm.    NM Stress Exercise 2018  - 6 mins, 7 METS, 95% MPHR  - Negative study    CT Chest 2019  - Extensive coronary calcifications   Patient is a 73-year-old man with history of CAD s/p PCI of the LAD/D1 2011, HTN, HLD, and DM2 presenting to ED with c/o left chest wall discomfort.   Of note, he had similar discomfort on exertion while mowing the lawn in september which prompted him to visit Dr. Bolton and at the time declined stress test. He now presents today due to recurrent left sided chest pain radiating to left arm now occurring at rest. He took ASA 81mg prior to ED arrival. Currently chest pain free.   Patient denies palpitations, diaphoresis, headaches, LH, dizziness, orthopnea, PND, LOC, cough, feeling bloated, N/V/D, or musculoskeletal pain. Endorses compliance with medications.    In ED, BP: 167/82 , HR: 98, resp. 16, temp: 97, SpO2: 98.    ECG with  T wave inversion in lateral and inferior leads, no acute Ischemic changes compare to prior ecg. first troponin 0.07. ASA 324MG given He will be admitted to cardiology telemetry for further ischemic evaluation     Diagnostic studies:  TTE 8/2020 - Normal biventricular function, mild LA dilatation, mild MR, mod Ao sclerosis, possible BAV, AoRoot 4.1cm.  TTE 5/2023 - Normal systolic function, G1DD, sclerotic Ao (trileaflet), Ao root 4.2cm, AscAo 3.8cm.    NM Stress Exercise 2018  - 6 mins, 7 METS, 95% MPHR  - Negative study    CT Chest 2019  - Extensive coronary calcifications

## 2023-10-19 NOTE — H&P ADULT - NSICDXFAMILYHX_GEN_ALL_CORE_FT
FAMILY HISTORY:  Father  Still living? Unknown  Family history of coronary artery disease in father, Age at diagnosis: 61-70

## 2023-10-19 NOTE — H&P ADULT - NSICDXPASTMEDICALHX_GEN_ALL_CORE_FT
PAST MEDICAL HISTORY:  CAD S/P percutaneous coronary angioplasty     DM (diabetes mellitus)     HLD (hyperlipidemia)     HTN (hypertension)

## 2023-10-19 NOTE — H&P ADULT - NSHPPHYSICALEXAM_GEN_ALL_CORE
General: No apparent distress  HEENT: Anicteric sclera. Moist mucous membranes. no JVD   Cardiac: Regular rate and rhythm. No murmurs, rubs, or gallops.   Vascular: Symmetric radial pulses. Dorsalis pedis pulses palpable.   Respiratory: Normal effort. Bibasilar crackles. Clear to ascultation.   Abdomen: Soft, nontender. Audible bowel sounds.   Extremities: Warm with no edema. No cyanosis or clubbing.   Skin: Warm and dry. No rash.   Neurologic: Grossly normal motor function.   Psychiatric: Euthymic. Oriented to person, place, and time.

## 2023-10-19 NOTE — H&P ADULT - NSHPLABSRESULTS_GEN_ALL_CORE
- Labs:                        15.7   8.76  )-----------( 239      ( 19 Oct 2023 13:15 )             47.9     10-19    139  |  101  |  15  ----------------------------<  234<H>  4.1   |  28  |  1.0    Ca    9.4      19 Oct 2023 13:15    TPro  6.8  /  Alb  4.6  /  TBili  0.4  /  DBili  x   /  AST  20  /  ALT  28  /  AlkPhos  112  10-19    LIVER FUNCTIONS - ( 19 Oct 2023 13:15 )  Alb: 4.6 g/dL / Pro: 6.8 g/dL / ALK PHOS: 112 U/L / ALT: 28 U/L / AST: 20 U/L / GGT: x             Troponin T, Serum: 0.07 ng/mL (10-19 @ 13:15)    CARDIAC MARKERS ( 19 Oct 2023 13:15 )  x     / 0.07 ng/mL / x     / x     / x            Lactate Trend    Urinalysis Basic - ( 19 Oct 2023 13:15 )    Color: x / Appearance: x / SG: x / pH: x  Gluc: 234 mg/dL / Ketone: x  / Bili: x / Urobili: x   Blood: x / Protein: x / Nitrite: x   Leuk Esterase: x / RBC: x / WBC x   Sq Epi: x / Non Sq Epi: x / Bacteria: x

## 2023-10-19 NOTE — CHART NOTE - NSCHARTNOTEFT_GEN_A_CORE
PRE-OP DIAGNOSIS:  NSTEMI (AUC 8)      PROCEDURE:     [x] Coronary Angiogram     [x] LHC     [] LVG     [] RHC     [] Intervention (see below)         PHYSICIAN:  Dr. Mott    Fellow:  Dr. Ingram, Dr. Potts     PROCEDURE DESCRIPTION:     Consent:      [x] Patient     [] Family Member     []  Used        Anesthesia:     [] General     [x] Sedation     [x] Local        Access & Closure:     [x] 6-Fr Radial Artery => TR band    [] Fr Femoral Artery     [] Fr Femoral Vein     [] Fr Brachial Vein       IV Contrast: 60 mL        Intervention: PCI w/ balloon angioplasty and LILIAN x1 to distal RCA      Implants: SYNERGY XD 3.5X16MM       FINDINGS:     Coronary Dominance: Right       LM: Mild disease    LAD: Patent prior stent in the proximal segment with mild in-stent restenosis. Moderate diffuse distal disease  D1: Mild disease  D2: 100%  at the site of prior stent. Supply via L=>L collaterals     CX: Moderate ostial disease  OM1: Mild disease      RCA: severe 99% distal stenosis s/p PCI  RPDA: Mild disease       LVEDP: 9 mmHg     EF: ECHO pending         ESTIMATED BLOOD LOSS: < 10 mL        CONDITION:     [x] Good     [] Fair     [] Critical        SPECIMEN REMOVED: N/A       POST-OP DIAGNOSIS:      [] Normal Coronary Angiogram     [] Mild Coronary Artery Disease (< 50% stenosis)     [x] 2- Vessel Coronary Artery Disease ( RCA s/p PCI and D2 ). Plan for staged PCI of Diag if anginal symptoms persist       PLAN OF CARE:     [] D/C Home Today     [x] Return to In-patient bed     [] Admit for observation     [x] Return for Staged Procedure : Plan for staged PCI of Diag if anginal symptoms persist    [] CT Surgery Consult     [x] Medications: ASA/Plavix/high-intensity statin/ARB    [x] IV Fluids: 150 cc/hr x 6hrs

## 2023-10-19 NOTE — DISCHARGE NOTE PROVIDER - HOSPITAL COURSE
73-year-old man with history of CAD s/p PCI of the LAD/D1 2011, HTN, HLD, and DM2 presented to ED on 10/19/2023 with c/o left chest wall discomfort.   Of note, he had similar discomfort on exertion while mowing the lawn in september which prompted him to visit Dr. Bolton and at the time declined stress test. He now presents today due to recurrent left sided chest pain radiating to left arm now occurring at rest. He took ASA 81mg prior to ED arrival. Currently chest pain free.   Patient denies palpitations, diaphoresis, headaches, LH, dizziness, orthopnea, PND, LOC, cough, feeling bloated, N/V/D, or musculoskeletal pain. Endorses compliance with medications.    In ED, BP: 167/82 , HR: 98, resp. 16, temp: 97, SpO2: 98.    ECG with  T wave inversion in lateral and inferior leads, no acute Ischemic changes compare to prior ecg. first troponin 0.07. ASA 324MG given He will be admitted to cardiology telemetry for further ischemic evaluation   Patient underwent cardiac catheterization and now  patient undernoted [facility] which revealed: LM with mild disease,  LAD patent prior stent in the proximal segment with mild in-stent restenosis. Moderate diffuse distal disease, D1 with mild disease, D2 with 100%  at the site of prior stent. Supply via L=>L collaterals    LCX with Moderate ostial disease, RCA with severe 99% distal stenosis s/p PCI ( SYNERGY XD 3.5X16MM) w/ balloon angioplasty and LILIAN x1 to distal RCA, RPDA with mild disease.    Patient was monitored overnight. On POD 1 patient remains HD stable with no complaints. Patient remains in SR with no arrhythmias noted on tele. EKG performed showed no acute ST changes. Examination of right radial artery showed a C/DI site with no hematoma, erythema or bruit. Distal pulses are 2+ bilaterally. Renal function remains stable post cath. Patient will be discharged home on DAPT with ASA and Plavix.  Patient is being DC home in stable condition.

## 2023-10-19 NOTE — PROGRESS NOTE ADULT - SUBJECTIVE AND OBJECTIVE BOX
Patient is here for [facility]    Patient is a 73-year-old man with history of CAD s/p PCI of the LAD/D1 2011, HTN, HLD, and DM2 presenting to ED with c/o left chest wall discomfort.   Of note, he had similar discomfort on exertion while mowing the lawn in september which prompted him to visit Dr. Bolton and at the time declined stress test. He now presents today due to recurrent left sided chest pain radiating to left arm now occurring at rest.    Patient denies palpitations, diaphoresis, headaches, LH, dizziness, orthopnea, PND, LOC, cough, feeling bloated, N/V/D, or musculoskeletal pain. Endorses compliance with medications.    In ED, BP: 167/82 , HR: 98, resp. 16, temp: 97, SpO2: 98.    ECG with no acute Ischemic changes compare to prior ecg. first troponin 0.07. He will be admitted to cardiology telemetry for further ischemic evaluation     Diagnostic studies:  TTE 8/2020 - Normal biventricular function, mild LA dilatation, mild MR, mod Ao sclerosis, possible BAV, AoRoot 4.1cm.  TTE 5/2023 - Normal systolic function, G1DD, sclerotic Ao (trileaflet), Ao root 4.2cm, AscAo 3.8cm.                                           PREOPERATIVE DAY OF PROCEDURE EVALUATION:  I have personally seen and examined the patient. I agree with the history and physical which I have reviewed and noted any changes below. ( to be signed electronically by MD ) 10-19-23 @ 16:40    Plavix 75 mg PO x1 given prior Cardiac Cath        IV NS given prior Cardiac Cath [x ]  CathPCI Bleeding Risk score is: 0.8%       RIGHT RADIAL ARTERY EVALUATION:  SHREYA TEST: [ ] Negative          [x ] Positive    Anti- Anginal medications:                        [ ] not used                       [x ] used:  [x ]CCB  [ ] BB  [ ] Nitrate [ ] Ranexa                [ ] not used but strong indication not to use Patient is here for [facility]      Patient is a 73-year-old man with history of CAD s/p PCI of the LAD/D1 2011, HTN, HLD, and DM2 presenting to ED with c/o left chest wall discomfort.   Of note, he had similar discomfort on exertion while mowing the lawn in september which prompted him to visit Dr. Bolton and at the time declined stress test. He now presents today due to recurrent left sided chest pain radiating to left arm now occurring at rest.    Patient denies palpitations, diaphoresis, headaches, LH, dizziness, orthopnea, PND, LOC, cough, feeling bloated, N/V/D, or musculoskeletal pain. Endorses compliance with medications.    In ED, BP: 167/82 , HR: 98, resp. 16, temp: 97, SpO2: 98.    ECG with no acute Ischemic changes compare to prior ecg. first troponin 0.07. He will be admitted to cardiology telemetry for further ischemic evaluation     Diagnostic studies:  TTE 8/2020 - Normal biventricular function, mild LA dilatation, mild MR, mod Ao sclerosis, possible BAV, AoRoot 4.1cm.  TTE 5/2023 - Normal systolic function, G1DD, sclerotic Ao (trileaflet), Ao root 4.2cm, AscAo 3.8cm.                                           PREOPERATIVE DAY OF PROCEDURE EVALUATION:  I have personally seen and examined the patient. I agree with the history and physical which I have reviewed and noted any changes below. ( to be signed electronically by MD ) 10-19-23 @ 16:40    Plavix 75 mg PO x1 given prior Cardiac Cath        IV NS given prior Cardiac Cath [x ]  CathPCI Bleeding Risk score is: 0.8%       RIGHT RADIAL ARTERY EVALUATION:  SHREYA TEST: [ ] Negative          [x ] Positive    Anti- Anginal medications:                        [ ] not used                       [x ] used:  [x ]CCB  [ ] BB  [ ] Nitrate [ ] Ranexa                [ ] not used but strong indication not to use

## 2023-10-19 NOTE — DISCHARGE NOTE PROVIDER - NSDCCPTREATMENT_GEN_ALL_CORE_FT
PRINCIPAL PROCEDURE  Procedure: Cardiac catheterization, left heart  Findings and Treatment: - Please take aspirin 81 mg daily and plavix, unless directed by your Cardiologist.  - Please do not take metformin. You can restart this medication on 9/21/2023 in the morning.   - Do not drive or operate heavy machinery for 24 hours.  - After 24 hours, you may shower and remove the dressing from the site.  - Avoid using affected arm for 24 hours.  - No heavy lifting (objects more than 5 lbs) for 1 week.  - Do not bathe or swim for 1 week.   - Do not rub or apply lotion, cream, or powder to the affected site. Leave it open to the air.   - Any sudden swelling, redness, fever, discharge, or severe pain, call your Cardiologist or call the Catheterization Lab at [phone].  - If there is bleeding from the puncture site, apply direct firm pressure on the site and call 911.     PRINCIPAL PROCEDURE  Procedure: Cardiac catheterization, left heart  Findings and Treatment: - Please take aspirin 81 mg daily and plavix, unless directed by your Cardiologist.  - Please do not take metformin. You can restart this medication on 10/22/2023 in the morning.   - Do not drive or operate heavy machinery for 24 hours.  - After 24 hours, you may shower and remove the dressing from the site.  - Avoid using affected arm for 24 hours.  - No heavy lifting (objects more than 5 lbs) for 1 week.  - Do not bathe or swim for 1 week.   - Do not rub or apply lotion, cream, or powder to the affected site. Leave it open to the air.   - Any sudden swelling, redness, fever, discharge, or severe pain, call your Cardiologist or call the Catheterization Lab at [phone].  - If there is bleeding from the puncture site, apply direct firm pressure on the site and call 911.

## 2023-10-19 NOTE — H&P ADULT - ASSESSMENT
Patient is a 73-year-old man with history of CAD s/p PCI of the LAD/D1 2011, HTN, HLD, and DM2 presenting to ED with c/o left chest wall discomfort. Admitted to cardiology telemetry for NSTEMI.    NSTEMI    - Admit to cardiology telemetry  - Check TTE  - Check lipid profile, HBA1C  - Give ASA 325mg POx1, then 81mg Q24, Plavix  - Lipitor 80mg QHS,   -  cardiac cath today         DIET: NPO  Code status: Full code   DVT prophylaxis: compression device  Patient is a 73-year-old man with history of CAD s/p PCI of the LAD/D1 2011, HTN, HLD, and DM2 presenting to ED with c/o left chest wall discomfort. Admitted to cardiology telemetry for NSTEMI.    #NSTEMI  #HX CAD sp PCI LAD/D1 2011  In ED,  ECG with T wave inversion in lateral and inferior leads. No acute Ischemic changes compare to prior ecg. first troponin 0.07. ASA 324MG po x1 given   TTE 5/2023 - Normal systolic function, G1DD, sclerotic Ao (trileaflet), Ao root 4.2cm, AscAo 3.8cm.  - Admit to cardiology telemetry  -  cont with ASA 81mg Q24, Plavix 75mg po  - Lipitor 80mg QHS,   -  cardiac cath today by Dr. Mott     #HTN  - cont on losartan 100mg + hctz 25mg po daily  - cont on amlodipine 10mg po daily      #HLD  9/11th LDL was 61, cholesterol 111mg/dl  - cont on lipitor 80mg     #DM II  Hgb A1C 9/11 was 7.5  - takes glyburide 5mg daily and metformin 1000mg BID ( hold while inpatient)  - ISS   - check F.S ACHS      #BPH   - Cont on flomax 0.4mg po daily      DIET: NPO for [facility]   Code status: Full code   DVT prophylaxis: sequential  compression device  Patient is a 73-year-old man with history of CAD s/p PCI of the LAD/D1 2011, HTN, HLD, and DM2 presenting to ED with c/o left chest wall discomfort. Admitted to cardiology telemetry for NSTEMI.    #NSTEMI  #HX CAD sp PCI LAD/D1 2011  In ED,  ECG with T wave inversion in lateral and inferior leads. No acute Ischemic changes compare to prior ecg. first troponin 0.07. ASA 324MG po x1 given   TTE 5/2023 - Normal systolic function, G1DD, sclerotic Ao (trileaflet), Ao root 4.2cm, AscAo 3.8cm.  - Admit to cardiology telemetry  -  cont with ASA 81mg Q24, Plavix 75mg po  - start metoprolol 25mg BID   - Lipitor 80mg QHS,   -  cardiac cath today by Dr. Mott     #HTN  - cont on losartan 100mg + hctz 25mg po daily  - cont on amlodipine 10mg po daily      #HLD  9/11th LDL was 61, cholesterol 111mg/dl  - cont on lipitor 80mg     #DM II  Hgb A1C 9/11 was 7.5  - takes glyburide 5mg daily and metformin 1000mg BID ( hold while inpatient)  - ISS   - check F.S ACHS      #BPH   - Cont on flomax 0.4mg po daily      DIET: NPO for [facility]   Code status: Full code   DVT prophylaxis: sequential  compression device

## 2023-10-19 NOTE — ED PROVIDER NOTE - OBJECTIVE STATEMENT
Detail Level: Detailed Quality 110: Preventive Care And Screening: Influenza Immunization: Influenza Immunization Administered during Influenza season Patient is a 73-year-old man with history of CAD s/p PCI of the LAD/D1 2011, HTN, HLD, and DM2 presenting to ED with c/o left chest wall discomfort. Of note, he had similar discomfort on exertion while mowing the lawn in september which prompted him to visit Dr. Bolton and at the time declined stress test. He now presents today due to recurrent left sided chest pain radiating to left arm now occurring at rest. He took ASA 81mg prior to ED arrival. Otherwise denies any fever, chills, headache, changes in vision, cough, congestion, palpitations, sob, n/v/d, abd pain, constipation, urinary complaints, lower extremity pain/swelling.

## 2023-10-19 NOTE — PATIENT PROFILE ADULT - FALL HARM RISK - CONCLUSION
Fall with Harm Risk Complexity (Necessary For Coding; Major - 90 Day Global With Some Exceptions; Minor - 10 Day Global): minor Initial Decision For Surgery?: No Risk Assessment Explanation (Does Not Render In The Note): Clinical determination of the probability and/or consequences of an event, such as surgery. Clinical assessment of the level of risk is affected by the nature of the event under consideration for the patient. Modifier 57 is used to indicate an Evaluation and Management (E/M) service resulted in the initial decision to perform surgery either the day before a major surgery (90 day global) or the day of a major surgery. Discussion: The nature of the lesion(s) was discussed.  Questions were answered.  I recommend surgery as appropriate treatment due to the site, size and histologic type of the tumor.  Surgery will mitigate risk of poor outcomes due to recurrence.  The procedure, morbidity (bruising, swelling, wound care, bandaging), activity restrictions, time off work and complications (bleeding, infection, scarring) were reviewed.  The potential appearance of the scar was discussed.  Questions were answered.  Their procedure was performed today, see dictation Date Of Surgery - Today Or Tomorrow?: today

## 2023-10-19 NOTE — DISCHARGE NOTE PROVIDER - NSDCMRMEDTOKEN_GEN_ALL_CORE_FT
amLODIPine 10 mg oral tablet: 1 tab(s) orally once a day  atorvastatin 80 mg oral tablet: 1 tab(s) orally once a day  clopidogrel 75 mg oral tablet: 1 tab(s) orally once a day  Flomax 0.4 mg oral capsule: 1 cap(s) orally once a day  glyBURIDE 5 mg oral tablet: 1 tab(s) orally once a day  losartan-hydroCHLOROthiazide 100 mg-25 mg oral tablet: 1 tab(s) orally once a day  metFORMIN 1000 mg oral tablet: 1 tab(s) orally every 12 hours   amLODIPine 10 mg oral tablet: 1 tab(s) orally once a day  aspirin 81 mg oral delayed release tablet: 1 tab(s) orally once a day  atorvastatin 80 mg oral tablet: 1 tab(s) orally once a day  clopidogrel 75 mg oral tablet: 1 tab(s) orally once a day  Flomax 0.4 mg oral capsule: 1 cap(s) orally once a day  glyBURIDE 5 mg oral tablet: 1 tab(s) orally once a day  hydroCHLOROthiazide 25 mg oral tablet: 1 tab(s) orally once a day  losartan-hydroCHLOROthiazide 100 mg-25 mg oral tablet: 1 tab(s) orally once a day  metFORMIN 1000 mg oral tablet: 1 tab(s) orally every 12 hours   amLODIPine 10 mg oral tablet: 1 tab(s) orally once a day  aspirin 81 mg oral delayed release tablet: 1 tab(s) orally once a day  atorvastatin 80 mg oral tablet: 1 tab(s) orally once a day  clopidogrel 75 mg oral tablet: 1 tab(s) orally once a day  Flomax 0.4 mg oral capsule: 1 cap(s) orally once a day  glyBURIDE 5 mg oral tablet: 1 tab(s) orally once a day  hydroCHLOROthiazide 25 mg oral tablet: 1 tab(s) orally once a day  losartan-hydroCHLOROthiazide 100 mg-25 mg oral tablet: 1 tab(s) orally once a day  metFORMIN 1000 mg oral tablet: 1 tab(s) orally every 12 hours  Toprol-XL 50 mg oral tablet, extended release: 1 tab(s) orally once a day

## 2023-10-19 NOTE — H&P ADULT - NS ATTEND AMEND GEN_ALL_CORE FT
Patient interviewed/examined on 10/20/2023.     73M with CAD s/p PCI who presented with chest pain. Admitted for NSTEMI.     S/p C 10/19/23 s/p LILIAN to 99% distal RCA, D2 with 100%  with collaterals    Continue DAPT, statin.   Add BB.     Martina Salazar MD  Vascular Cardiology Attending  Please text or call via MS Teams with questions

## 2023-10-19 NOTE — DISCHARGE NOTE PROVIDER - NSDCFUSCHEDAPPT_GEN_ALL_CORE_FT
Raad Bolton  [facility] Physician [facility]  CARDIOLOGY [address] [place] [address]  Scheduled Appointment: 01/16/2024

## 2023-10-19 NOTE — ED ADULT NURSE NOTE - NSFALLRISKASMTTYPE_ED_ALL_ED
Covid testing has been completed and no covid concerns at this time. Patient is compliant wearing his facial mask and practicing social distancing.   Initial (On Arrival)

## 2023-10-19 NOTE — CHART NOTE - NSCHARTNOTEFT_GEN_A_CORE
Interventional Cardiology Radial band Removal Note    s/p Heparin 			    Pt without complaints.  VSS.    Pre removal exam  Right Radial access site TR Band in place, NO hematoma, NO bleed  Radial pulse: 2+ palpable pulse     TR Band  2 CC removed at 930pm  2_CC removed at _940pm  2_CC removed at 950pm  2 CC removed at 10pm  2cc removed at 1010pm    Hemostasis achieved with manual release of band.      No Vasovagal reaction.    Meds given: No med given     Post removal exam  Right  Radial access site NO hematoma, No bleed  Radial pulse: 2+ palpable     A/P:  s/p  PCI w/ balloon angioplasty and LILIAN x1 to distal RCA  -	continue to monitor  -	-OOB as tolerated  -	Post Procedure Instructions given  -                   Call [phone] if any access site issues.

## 2023-10-19 NOTE — DISCHARGE NOTE PROVIDER - NSDCCPCAREPLAN_GEN_ALL_CORE_FT
PRINCIPAL DISCHARGE DIAGNOSIS  Diagnosis: NSTEMI (non-ST elevation myocardial infarction)  Assessment and Plan of Treatment:       SECONDARY DISCHARGE DIAGNOSES  Diagnosis: CAD (coronary artery disease)  Assessment and Plan of Treatment:

## 2023-10-20 ENCOUNTER — TRANSCRIPTION ENCOUNTER (OUTPATIENT)
Age: 73
End: 2023-10-20

## 2023-10-20 VITALS
RESPIRATION RATE: 18 BRPM | OXYGEN SATURATION: 96 % | DIASTOLIC BLOOD PRESSURE: 77 MMHG | SYSTOLIC BLOOD PRESSURE: 139 MMHG | HEART RATE: 74 BPM | TEMPERATURE: 98 F

## 2023-10-20 LAB
ALBUMIN SERPL ELPH-MCNC: 3.7 G/DL — SIGNIFICANT CHANGE UP (ref 3.5–5.2)
ALBUMIN SERPL ELPH-MCNC: 3.7 G/DL — SIGNIFICANT CHANGE UP (ref 3.5–5.2)
ALP SERPL-CCNC: 89 U/L — SIGNIFICANT CHANGE UP (ref 30–115)
ALP SERPL-CCNC: 89 U/L — SIGNIFICANT CHANGE UP (ref 30–115)
ALT FLD-CCNC: 25 U/L — SIGNIFICANT CHANGE UP (ref 0–41)
ALT FLD-CCNC: 25 U/L — SIGNIFICANT CHANGE UP (ref 0–41)
ANION GAP SERPL CALC-SCNC: 14 MMOL/L — SIGNIFICANT CHANGE UP (ref 7–14)
ANION GAP SERPL CALC-SCNC: 14 MMOL/L — SIGNIFICANT CHANGE UP (ref 7–14)
AST SERPL-CCNC: 23 U/L — SIGNIFICANT CHANGE UP (ref 0–41)
AST SERPL-CCNC: 23 U/L — SIGNIFICANT CHANGE UP (ref 0–41)
BASOPHILS # BLD AUTO: 0.06 K/UL — SIGNIFICANT CHANGE UP (ref 0–0.2)
BASOPHILS # BLD AUTO: 0.06 K/UL — SIGNIFICANT CHANGE UP (ref 0–0.2)
BASOPHILS NFR BLD AUTO: 0.7 % — SIGNIFICANT CHANGE UP (ref 0–1)
BASOPHILS NFR BLD AUTO: 0.7 % — SIGNIFICANT CHANGE UP (ref 0–1)
BILIRUB SERPL-MCNC: 0.5 MG/DL — SIGNIFICANT CHANGE UP (ref 0.2–1.2)
BILIRUB SERPL-MCNC: 0.5 MG/DL — SIGNIFICANT CHANGE UP (ref 0.2–1.2)
BUN SERPL-MCNC: 11 MG/DL — SIGNIFICANT CHANGE UP (ref 10–20)
BUN SERPL-MCNC: 11 MG/DL — SIGNIFICANT CHANGE UP (ref 10–20)
CALCIUM SERPL-MCNC: 8.8 MG/DL — SIGNIFICANT CHANGE UP (ref 8.4–10.5)
CALCIUM SERPL-MCNC: 8.8 MG/DL — SIGNIFICANT CHANGE UP (ref 8.4–10.5)
CHLORIDE SERPL-SCNC: 103 MMOL/L — SIGNIFICANT CHANGE UP (ref 98–110)
CHLORIDE SERPL-SCNC: 103 MMOL/L — SIGNIFICANT CHANGE UP (ref 98–110)
CHOLEST SERPL-MCNC: 109 MG/DL — SIGNIFICANT CHANGE UP
CHOLEST SERPL-MCNC: 109 MG/DL — SIGNIFICANT CHANGE UP
CO2 SERPL-SCNC: 20 MMOL/L — SIGNIFICANT CHANGE UP (ref 17–32)
CO2 SERPL-SCNC: 20 MMOL/L — SIGNIFICANT CHANGE UP (ref 17–32)
CREAT SERPL-MCNC: 0.8 MG/DL — SIGNIFICANT CHANGE UP (ref 0.7–1.5)
CREAT SERPL-MCNC: 0.8 MG/DL — SIGNIFICANT CHANGE UP (ref 0.7–1.5)
EGFR: 93 ML/MIN/1.73M2 — SIGNIFICANT CHANGE UP
EGFR: 93 ML/MIN/1.73M2 — SIGNIFICANT CHANGE UP
EOSINOPHIL # BLD AUTO: 0.3 K/UL — SIGNIFICANT CHANGE UP (ref 0–0.7)
EOSINOPHIL # BLD AUTO: 0.3 K/UL — SIGNIFICANT CHANGE UP (ref 0–0.7)
EOSINOPHIL NFR BLD AUTO: 3.5 % — SIGNIFICANT CHANGE UP (ref 0–8)
EOSINOPHIL NFR BLD AUTO: 3.5 % — SIGNIFICANT CHANGE UP (ref 0–8)
GLUCOSE BLDC GLUCOMTR-MCNC: 131 MG/DL — HIGH (ref 70–99)
GLUCOSE BLDC GLUCOMTR-MCNC: 131 MG/DL — HIGH (ref 70–99)
GLUCOSE SERPL-MCNC: 101 MG/DL — HIGH (ref 70–99)
GLUCOSE SERPL-MCNC: 101 MG/DL — HIGH (ref 70–99)
HCT VFR BLD CALC: 43.4 % — SIGNIFICANT CHANGE UP (ref 42–52)
HCT VFR BLD CALC: 43.4 % — SIGNIFICANT CHANGE UP (ref 42–52)
HCV AB S/CO SERPL IA: 0.88 COI — SIGNIFICANT CHANGE UP
HCV AB S/CO SERPL IA: 0.88 COI — SIGNIFICANT CHANGE UP
HCV AB SERPL-IMP: SIGNIFICANT CHANGE UP
HCV AB SERPL-IMP: SIGNIFICANT CHANGE UP
HDLC SERPL-MCNC: 29 MG/DL — LOW
HDLC SERPL-MCNC: 29 MG/DL — LOW
HGB BLD-MCNC: 14.4 G/DL — SIGNIFICANT CHANGE UP (ref 14–18)
HGB BLD-MCNC: 14.4 G/DL — SIGNIFICANT CHANGE UP (ref 14–18)
IMM GRANULOCYTES NFR BLD AUTO: 0.3 % — SIGNIFICANT CHANGE UP (ref 0.1–0.3)
IMM GRANULOCYTES NFR BLD AUTO: 0.3 % — SIGNIFICANT CHANGE UP (ref 0.1–0.3)
LIPID PNL WITH DIRECT LDL SERPL: 60 MG/DL — SIGNIFICANT CHANGE UP
LIPID PNL WITH DIRECT LDL SERPL: 60 MG/DL — SIGNIFICANT CHANGE UP
LYMPHOCYTES # BLD AUTO: 1.2 K/UL — SIGNIFICANT CHANGE UP (ref 1.2–3.4)
LYMPHOCYTES # BLD AUTO: 1.2 K/UL — SIGNIFICANT CHANGE UP (ref 1.2–3.4)
LYMPHOCYTES # BLD AUTO: 13.9 % — LOW (ref 20.5–51.1)
LYMPHOCYTES # BLD AUTO: 13.9 % — LOW (ref 20.5–51.1)
MAGNESIUM SERPL-MCNC: 1.6 MG/DL — LOW (ref 1.8–2.4)
MAGNESIUM SERPL-MCNC: 1.6 MG/DL — LOW (ref 1.8–2.4)
MCHC RBC-ENTMCNC: 28.5 PG — SIGNIFICANT CHANGE UP (ref 27–31)
MCHC RBC-ENTMCNC: 28.5 PG — SIGNIFICANT CHANGE UP (ref 27–31)
MCHC RBC-ENTMCNC: 33.2 G/DL — SIGNIFICANT CHANGE UP (ref 32–37)
MCHC RBC-ENTMCNC: 33.2 G/DL — SIGNIFICANT CHANGE UP (ref 32–37)
MCV RBC AUTO: 85.9 FL — SIGNIFICANT CHANGE UP (ref 80–94)
MCV RBC AUTO: 85.9 FL — SIGNIFICANT CHANGE UP (ref 80–94)
MONOCYTES # BLD AUTO: 0.69 K/UL — HIGH (ref 0.1–0.6)
MONOCYTES # BLD AUTO: 0.69 K/UL — HIGH (ref 0.1–0.6)
MONOCYTES NFR BLD AUTO: 8 % — SIGNIFICANT CHANGE UP (ref 1.7–9.3)
MONOCYTES NFR BLD AUTO: 8 % — SIGNIFICANT CHANGE UP (ref 1.7–9.3)
NEUTROPHILS # BLD AUTO: 6.38 K/UL — SIGNIFICANT CHANGE UP (ref 1.4–6.5)
NEUTROPHILS # BLD AUTO: 6.38 K/UL — SIGNIFICANT CHANGE UP (ref 1.4–6.5)
NEUTROPHILS NFR BLD AUTO: 73.6 % — SIGNIFICANT CHANGE UP (ref 42.2–75.2)
NEUTROPHILS NFR BLD AUTO: 73.6 % — SIGNIFICANT CHANGE UP (ref 42.2–75.2)
NON HDL CHOLESTEROL: 80 MG/DL — SIGNIFICANT CHANGE UP
NON HDL CHOLESTEROL: 80 MG/DL — SIGNIFICANT CHANGE UP
NRBC # BLD: 0 /100 WBCS — SIGNIFICANT CHANGE UP (ref 0–0)
NRBC # BLD: 0 /100 WBCS — SIGNIFICANT CHANGE UP (ref 0–0)
PLATELET # BLD AUTO: 204 K/UL — SIGNIFICANT CHANGE UP (ref 130–400)
PLATELET # BLD AUTO: 204 K/UL — SIGNIFICANT CHANGE UP (ref 130–400)
PMV BLD: 10.3 FL — SIGNIFICANT CHANGE UP (ref 7.4–10.4)
PMV BLD: 10.3 FL — SIGNIFICANT CHANGE UP (ref 7.4–10.4)
POTASSIUM SERPL-MCNC: 3.8 MMOL/L — SIGNIFICANT CHANGE UP (ref 3.5–5)
POTASSIUM SERPL-MCNC: 3.8 MMOL/L — SIGNIFICANT CHANGE UP (ref 3.5–5)
POTASSIUM SERPL-SCNC: 3.8 MMOL/L — SIGNIFICANT CHANGE UP (ref 3.5–5)
POTASSIUM SERPL-SCNC: 3.8 MMOL/L — SIGNIFICANT CHANGE UP (ref 3.5–5)
PROT SERPL-MCNC: 5.6 G/DL — LOW (ref 6–8)
PROT SERPL-MCNC: 5.6 G/DL — LOW (ref 6–8)
RBC # BLD: 5.05 M/UL — SIGNIFICANT CHANGE UP (ref 4.7–6.1)
RBC # BLD: 5.05 M/UL — SIGNIFICANT CHANGE UP (ref 4.7–6.1)
RBC # FLD: 14.3 % — SIGNIFICANT CHANGE UP (ref 11.5–14.5)
RBC # FLD: 14.3 % — SIGNIFICANT CHANGE UP (ref 11.5–14.5)
SODIUM SERPL-SCNC: 137 MMOL/L — SIGNIFICANT CHANGE UP (ref 135–146)
SODIUM SERPL-SCNC: 137 MMOL/L — SIGNIFICANT CHANGE UP (ref 135–146)
TRIGL SERPL-MCNC: 96 MG/DL — SIGNIFICANT CHANGE UP
TRIGL SERPL-MCNC: 96 MG/DL — SIGNIFICANT CHANGE UP
WBC # BLD: 8.66 K/UL — SIGNIFICANT CHANGE UP (ref 4.8–10.8)
WBC # BLD: 8.66 K/UL — SIGNIFICANT CHANGE UP (ref 4.8–10.8)
WBC # FLD AUTO: 8.66 K/UL — SIGNIFICANT CHANGE UP (ref 4.8–10.8)
WBC # FLD AUTO: 8.66 K/UL — SIGNIFICANT CHANGE UP (ref 4.8–10.8)

## 2023-10-20 PROCEDURE — 93010 ELECTROCARDIOGRAM REPORT: CPT

## 2023-10-20 PROCEDURE — 99223 1ST HOSP IP/OBS HIGH 75: CPT

## 2023-10-20 PROCEDURE — 99024 POSTOP FOLLOW-UP VISIT: CPT

## 2023-10-20 PROCEDURE — 99238 HOSP IP/OBS DSCHRG MGMT 30/<: CPT

## 2023-10-20 RX ORDER — MAGNESIUM SULFATE 500 MG/ML
2 VIAL (ML) INJECTION ONCE
Refills: 0 | Status: COMPLETED | OUTPATIENT
Start: 2023-10-20 | End: 2023-10-20

## 2023-10-20 RX ORDER — HYDROCHLOROTHIAZIDE 25 MG
1 TABLET ORAL
Qty: 0 | Refills: 0 | DISCHARGE
Start: 2023-10-20

## 2023-10-20 RX ORDER — ASPIRIN/CALCIUM CARB/MAGNESIUM 324 MG
1 TABLET ORAL
Qty: 0 | Refills: 0 | DISCHARGE
Start: 2023-10-20

## 2023-10-20 RX ORDER — METOPROLOL TARTRATE 50 MG
1 TABLET ORAL
Qty: 30 | Refills: 2
Start: 2023-10-20 | End: 2024-01-17

## 2023-10-20 RX ORDER — POTASSIUM CHLORIDE 20 MEQ
40 PACKET (EA) ORAL ONCE
Refills: 0 | Status: COMPLETED | OUTPATIENT
Start: 2023-10-20 | End: 2023-10-20

## 2023-10-20 RX ADMIN — CLOPIDOGREL BISULFATE 75 MILLIGRAM(S): 75 TABLET, FILM COATED ORAL at 11:39

## 2023-10-20 RX ADMIN — Medication 25 MILLIGRAM(S): at 05:44

## 2023-10-20 RX ADMIN — Medication 40 MILLIEQUIVALENT(S): at 09:59

## 2023-10-20 RX ADMIN — AMLODIPINE BESYLATE 10 MILLIGRAM(S): 2.5 TABLET ORAL at 05:44

## 2023-10-20 RX ADMIN — Medication 25 GRAM(S): at 09:59

## 2023-10-20 RX ADMIN — Medication 81 MILLIGRAM(S): at 11:39

## 2023-10-20 NOTE — DISCHARGE NOTE NURSING/CASE MANAGEMENT/SOCIAL WORK - PATIENT PORTAL LINK FT
You can access the FollowMyHealth Patient Portal offered by [facility] by registering at the following website: [email or web address]. By joining [employer]’s FollowMyHealth portal, you will also be able to view your health information using other applications (apps) compatible with our system. Pfizer dose 1 and 2

## 2023-10-20 NOTE — DISCHARGE NOTE NURSING/CASE MANAGEMENT/SOCIAL WORK - NSDCPEFALRISK_GEN_ALL_CORE
For information on Fall & Injury Prevention, visit: [email or web address] OR  [email or web address] OR  https://www.cdc.gov/steadi/patient.html

## 2023-11-01 PROBLEM — E78.5 HYPERLIPIDEMIA, UNSPECIFIED: Chronic | Status: ACTIVE | Noted: 2023-10-19

## 2023-11-01 PROBLEM — E11.9 TYPE 2 DIABETES MELLITUS WITHOUT COMPLICATIONS: Chronic | Status: ACTIVE | Noted: 2023-10-19

## 2023-11-01 PROBLEM — I10 ESSENTIAL (PRIMARY) HYPERTENSION: Chronic | Status: ACTIVE | Noted: 2023-10-19

## 2023-11-01 PROBLEM — I25.10 ATHEROSCLEROTIC HEART DISEASE OF NATIVE CORONARY ARTERY WITHOUT ANGINA PECTORIS: Chronic | Status: ACTIVE | Noted: 2023-10-19

## 2023-11-02 DIAGNOSIS — I10 ESSENTIAL (PRIMARY) HYPERTENSION: ICD-10-CM
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
## 2023-11-02 DIAGNOSIS — E11.9 TYPE 2 DIABETES MELLITUS WITHOUT COMPLICATIONS: ICD-10-CM

## 2023-11-02 DIAGNOSIS — T82.855A STENOSIS OF CORONARY ARTERY STENT, INITIAL ENCOUNTER: ICD-10-CM

## 2023-11-02 DIAGNOSIS — E83.42 HYPOMAGNESEMIA: ICD-10-CM

## 2023-11-02 DIAGNOSIS — I25.82 CHRONIC TOTAL OCCLUSION OF CORONARY ARTERY: ICD-10-CM

## 2023-11-02 DIAGNOSIS — Z79.02 LONG TERM (CURRENT) USE OF ANTITHROMBOTICS/ANTIPLATELETS: ICD-10-CM

## 2023-11-02 DIAGNOSIS — E78.5 HYPERLIPIDEMIA, UNSPECIFIED: ICD-10-CM

## 2023-11-02 DIAGNOSIS — N40.0 BENIGN PROSTATIC HYPERPLASIA WITHOUT LOWER URINARY TRACT SYMPTOMS: ICD-10-CM

## 2023-11-02 DIAGNOSIS — I25.10 ATHEROSCLEROTIC HEART DISEASE OF NATIVE CORONARY ARTERY WITHOUT ANGINA PECTORIS: ICD-10-CM

## 2023-11-02 DIAGNOSIS — Z79.84 LONG TERM (CURRENT) USE OF ORAL HYPOGLYCEMIC DRUGS: ICD-10-CM

## 2023-11-02 DIAGNOSIS — Z82.49 FAMILY HISTORY OF ISCHEMIC HEART DISEASE AND OTHER DISEASES OF THE CIRCULATORY SYSTEM: ICD-10-CM
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
## 2023-11-02 DIAGNOSIS — I21.4 NON-ST ELEVATION (NSTEMI) MYOCARDIAL INFARCTION: ICD-10-CM

## 2023-11-02 DIAGNOSIS — Z98.61 CORONARY ANGIOPLASTY STATUS: ICD-10-CM

## 2023-11-20 ENCOUNTER — APPOINTMENT (OUTPATIENT)
Dept: CARDIOLOGY | Facility: CLINIC | Age: 73
End: 2023-11-20
Payer: MEDICARE

## 2023-11-20 ENCOUNTER — RESULT CHARGE (OUTPATIENT)
Age: 73
End: 2023-11-20

## 2023-11-20 ENCOUNTER — NON-APPOINTMENT (OUTPATIENT)
Age: 73
End: 2023-11-20

## 2023-11-20 VITALS
SYSTOLIC BLOOD PRESSURE: 138 MMHG | DIASTOLIC BLOOD PRESSURE: 76 MMHG | WEIGHT: 225 LBS | BODY MASS INDEX: 27.4 KG/M2 | HEIGHT: 76 IN | HEART RATE: 80 BPM

## 2023-11-20 DIAGNOSIS — I22.2 SUBSEQUENT NON-ST ELEVATION (NSTEMI) MYOCARDIAL INFARCTION: ICD-10-CM

## 2023-11-20 DIAGNOSIS — Z95.5 PRESENCE OF CORONARY ANGIOPLASTY IMPLANT AND GRAFT: ICD-10-CM

## 2023-11-20 DIAGNOSIS — E78.5 HYPERLIPIDEMIA, UNSPECIFIED: ICD-10-CM

## 2023-11-20 PROCEDURE — 93000 ELECTROCARDIOGRAM COMPLETE: CPT

## 2023-11-20 PROCEDURE — 99214 OFFICE O/P EST MOD 30 MIN: CPT

## 2023-12-04 ENCOUNTER — APPOINTMENT (OUTPATIENT)
Dept: CARDIOLOGY | Facility: CLINIC | Age: 73
End: 2023-12-04

## 2024-01-16 ENCOUNTER — APPOINTMENT (OUTPATIENT)
Dept: CARDIOLOGY | Facility: CLINIC | Age: 74
End: 2024-01-16

## 2024-05-07 NOTE — ED ADULT TRIAGE NOTE - BP NONINVASIVE DIASTOLIC (MM HG)
Problem: Pain  Goal: Acceptable pain level achieved/maintained at rest using appropriate pain scale for the patient  Outcome: Monitoring/Evaluating progress  Goal: Acceptable pain level achieved/maintained with activity using appropriate pain scale for the patient  Outcome: Monitoring/Evaluating progress  Goal: Acceptable pain level achieved/maintained without oversedation  Outcome: Monitoring/Evaluating progress     Problem: Anxiety  Goal: Anxiety is at a manageable level with interventions  Outcome: Monitoring/Evaluating progress  Goal: Anxiety is decreased  Outcome: Monitoring/Evaluating progress  Goal: Verbalizes understanding of anxiety symptoms and management  Outcome: Monitoring/Evaluating progress      82

## 2024-05-20 ENCOUNTER — RESULT CHARGE (OUTPATIENT)
Age: 74
End: 2024-05-20

## 2024-05-20 ENCOUNTER — APPOINTMENT (OUTPATIENT)
Dept: CARDIOLOGY | Facility: CLINIC | Age: 74
End: 2024-05-20
Payer: MEDICARE

## 2024-05-20 VITALS
BODY MASS INDEX: 27.4 KG/M2 | HEART RATE: 77 BPM | SYSTOLIC BLOOD PRESSURE: 132 MMHG | HEIGHT: 76 IN | WEIGHT: 225 LBS | DIASTOLIC BLOOD PRESSURE: 70 MMHG

## 2024-05-20 DIAGNOSIS — I10 ESSENTIAL (PRIMARY) HYPERTENSION: ICD-10-CM

## 2024-05-20 DIAGNOSIS — I77.810 THORACIC AORTIC ECTASIA: ICD-10-CM

## 2024-05-20 DIAGNOSIS — E11.9 TYPE 2 DIABETES MELLITUS W/OUT COMPLICATIONS: ICD-10-CM

## 2024-05-20 DIAGNOSIS — I34.0 NONRHEUMATIC MITRAL (VALVE) INSUFFICIENCY: ICD-10-CM

## 2024-05-20 DIAGNOSIS — I51.89 OTHER ILL-DEFINED HEART DISEASES: ICD-10-CM

## 2024-05-20 DIAGNOSIS — I25.10 ATHEROSCLEROTIC HEART DISEASE OF NATIVE CORONARY ARTERY W/OUT ANGINA PECTORIS: ICD-10-CM

## 2024-05-20 PROCEDURE — 93000 ELECTROCARDIOGRAM COMPLETE: CPT

## 2024-05-20 PROCEDURE — G2211 COMPLEX E/M VISIT ADD ON: CPT

## 2024-05-20 PROCEDURE — 99214 OFFICE O/P EST MOD 30 MIN: CPT

## 2024-05-20 RX ORDER — METOPROLOL SUCCINATE 50 MG/1
50 TABLET, EXTENDED RELEASE ORAL DAILY
Qty: 90 | Refills: 3 | Status: ACTIVE | COMMUNITY
Start: 2023-11-20 | End: 1900-01-01

## 2024-05-20 RX ORDER — CLOPIDOGREL BISULFATE 75 MG/1
75 TABLET, FILM COATED ORAL
Qty: 90 | Refills: 3 | Status: ACTIVE | COMMUNITY
Start: 2020-09-20 | End: 1900-01-01

## 2024-05-20 RX ORDER — ATORVASTATIN CALCIUM 80 MG/1
80 TABLET, FILM COATED ORAL DAILY
Qty: 90 | Refills: 3 | Status: ACTIVE | COMMUNITY
Start: 1900-01-01 | End: 1900-01-01

## 2024-05-20 RX ORDER — AMLODIPINE BESYLATE 10 MG/1
10 TABLET ORAL DAILY
Qty: 90 | Refills: 3 | Status: ACTIVE | COMMUNITY
Start: 2022-06-24 | End: 1900-01-01

## 2024-05-20 RX ORDER — LOSARTAN POTASSIUM AND HYDROCHLOROTHIAZIDE 25; 100 MG/1; MG/1
100-25 TABLET ORAL DAILY
Qty: 90 | Refills: 3 | Status: ACTIVE | COMMUNITY
Start: 1900-01-01 | End: 1900-01-01

## 2024-05-20 NOTE — ASSESSMENT
[FreeTextEntry1] : 74 y/o male with history as above. Cath reviewed  CAD s/p PCI of LAD/diag in 2011 Now s/p PCI of RCA due to acute lesion C/w secondary prevention C/w DAPT for 12 months, then ASA 81.  DM C/w glycemic control Check HgA1c and lipids with the PMD Last labs noted - controlled  HTN C/w amlodipine, c/w losartan, HCTZ Low salt diet  Patient was advised about healthy lifestyle changes, including diet and exercise. Importance of sustained long-term weight loss was discussed, questions answered.  F/u in 6 months

## 2024-05-20 NOTE — PHYSICAL EXAM
[No Acute Distress] : no acute distress [Normal Venous Pressure] : normal venous pressure [No Carotid Bruit] : no carotid bruit [Normal S1, S2] : normal S1, S2 [No Murmur] : no murmur [No Rub] : no rub [No Gallop] : no gallop [Clear Lung Fields] : clear lung fields [Normal Bowel Sounds] : normal bowel sounds [No Edema] : no edema [No Varicosities] : no varicosities [No Rash] : no rash [No Skin Lesions] : no skin lesions [Moves all extremities] : moves all extremities [No Focal Deficits] : no focal deficits [Normal Speech] : normal speech [Alert and Oriented] : alert and oriented [Normal memory] : normal memory

## 2024-05-20 NOTE — HISTORY OF PRESENT ILLNESS
[FreeTextEntry1] : Pt is 73 year old man with PMH of  CAD s/p PCI of the LAD/D1 2011, HTN, HLD, DM.  S/p hospitalization due to chest pain. S/p LHC on 10/19/23  which revealed: LM with mild disease,  LAD patent prior stent in the prox segm with mild in-stent restenosis. Moderate diffuse distal disease, D1 with mild disease, D2 with 100%  at the site of prior stent. Supply via L=>L collaterals. RCA with severe 99% distal stenosis. S/p PCI to distal RCA  11/16/23 Chol 126 Trig 72 LDL 74 HgbA1C 7.7  Pt denies chest pain, no SOB, no edema.   Pt reports  LLE  edema  TTE 05/18/23 EF 63 % Grade I diastolic dysfunction Mildly dilated Ascending aorta 3.8 cm  05/08/24 HgbA1C 7.3  01/30/24 Chol 129  LDL 72 Trig 81

## 2024-11-18 ENCOUNTER — APPOINTMENT (OUTPATIENT)
Dept: CARDIOLOGY | Facility: CLINIC | Age: 74
End: 2024-11-18
Payer: MEDICARE

## 2024-11-18 ENCOUNTER — RESULT CHARGE (OUTPATIENT)
Age: 74
End: 2024-11-18

## 2024-11-18 VITALS
HEIGHT: 76 IN | BODY MASS INDEX: 27.64 KG/M2 | HEART RATE: 86 BPM | WEIGHT: 227 LBS | SYSTOLIC BLOOD PRESSURE: 118 MMHG | DIASTOLIC BLOOD PRESSURE: 70 MMHG

## 2024-11-18 DIAGNOSIS — I35.1 NONRHEUMATIC AORTIC (VALVE) INSUFFICIENCY: ICD-10-CM

## 2024-11-18 DIAGNOSIS — I10 ESSENTIAL (PRIMARY) HYPERTENSION: ICD-10-CM

## 2024-11-18 DIAGNOSIS — E11.9 TYPE 2 DIABETES MELLITUS W/OUT COMPLICATIONS: ICD-10-CM

## 2024-11-18 DIAGNOSIS — Z95.5 PRESENCE OF CORONARY ANGIOPLASTY IMPLANT AND GRAFT: ICD-10-CM

## 2024-11-18 DIAGNOSIS — Z98.61 CORONARY ANGIOPLASTY STATUS: ICD-10-CM

## 2024-11-18 DIAGNOSIS — I25.10 ATHEROSCLEROTIC HEART DISEASE OF NATIVE CORONARY ARTERY W/OUT ANGINA PECTORIS: ICD-10-CM

## 2024-11-18 DIAGNOSIS — I34.0 NONRHEUMATIC MITRAL (VALVE) INSUFFICIENCY: ICD-10-CM

## 2024-11-18 DIAGNOSIS — I22.2 SUBSEQUENT NON-ST ELEVATION (NSTEMI) MYOCARDIAL INFARCTION: ICD-10-CM

## 2024-11-18 DIAGNOSIS — E78.5 HYPERLIPIDEMIA, UNSPECIFIED: ICD-10-CM

## 2024-11-18 PROCEDURE — 99214 OFFICE O/P EST MOD 30 MIN: CPT

## 2024-11-18 PROCEDURE — G2211 COMPLEX E/M VISIT ADD ON: CPT

## 2024-11-18 PROCEDURE — 93000 ELECTROCARDIOGRAM COMPLETE: CPT

## 2024-11-18 RX ORDER — ASPIRIN 81 MG/1
81 TABLET, COATED ORAL
Qty: 90 | Refills: 2 | Status: ACTIVE | COMMUNITY
Start: 2024-11-18 | End: 1900-01-01

## 2025-03-11 ENCOUNTER — RX RENEWAL (OUTPATIENT)
Age: 75
End: 2025-03-11

## 2025-07-07 ENCOUNTER — RX RENEWAL (OUTPATIENT)
Age: 75
End: 2025-07-07